# Patient Record
Sex: MALE | Race: ASIAN | NOT HISPANIC OR LATINO | Employment: FULL TIME | URBAN - METROPOLITAN AREA
[De-identification: names, ages, dates, MRNs, and addresses within clinical notes are randomized per-mention and may not be internally consistent; named-entity substitution may affect disease eponyms.]

---

## 2023-03-28 ENCOUNTER — OFFICE VISIT (OUTPATIENT)
Dept: FAMILY MEDICINE | Facility: CLINIC | Age: 48
End: 2023-03-28
Payer: COMMERCIAL

## 2023-03-28 VITALS
TEMPERATURE: 97.7 F | BODY MASS INDEX: 19.81 KG/M2 | DIASTOLIC BLOOD PRESSURE: 65 MMHG | OXYGEN SATURATION: 100 % | WEIGHT: 116 LBS | HEIGHT: 64 IN | HEART RATE: 67 BPM | SYSTOLIC BLOOD PRESSURE: 115 MMHG

## 2023-03-28 DIAGNOSIS — E05.90 HYPERTHYROIDISM: ICD-10-CM

## 2023-03-28 DIAGNOSIS — E04.2 NON-TOXIC MULTINODULAR GOITER: ICD-10-CM

## 2023-03-28 DIAGNOSIS — E04.9 GOITER: Primary | ICD-10-CM

## 2023-03-28 LAB
T4 FREE SERPL-MCNC: 1.03 NG/DL (ref 0.76–1.46)
TSH SERPL DL<=0.005 MIU/L-ACNC: 0.34 MU/L (ref 0.4–4)

## 2023-03-28 PROCEDURE — 86376 MICROSOMAL ANTIBODY EACH: CPT | Performed by: FAMILY MEDICINE

## 2023-03-28 PROCEDURE — 84443 ASSAY THYROID STIM HORMONE: CPT | Performed by: FAMILY MEDICINE

## 2023-03-28 PROCEDURE — 99204 OFFICE O/P NEW MOD 45 MIN: CPT | Performed by: FAMILY MEDICINE

## 2023-03-28 PROCEDURE — 84439 ASSAY OF FREE THYROXINE: CPT | Performed by: FAMILY MEDICINE

## 2023-03-28 PROCEDURE — 36415 COLL VENOUS BLD VENIPUNCTURE: CPT | Performed by: FAMILY MEDICINE

## 2023-03-28 NOTE — LETTER
March 30, 2023      Omar Walsh  [address] [place] [id number]        Mr. Walsh,     Sounds like you already have a referral for endocrinology.  It appears your thyroid is hyperthyroidism, and the TPO is elevated.  This could potentially mean something called Hashimoto's or Graves'.  Please let us know if you are unable to get into endocrinology in the next month or two.     Please contact the clinic if you have additional questions.  Thank you.     Sincerely,     DANG Altman, NP-C   [facility]   (For Dr. Hawley who is out of office)       Resulted Orders   TSH with free T4 reflex   Result Value Ref Range    TSH 0.34 (L) 0.40 - 4.00 mU/L   Thyroid peroxidase antibody   Result Value Ref Range    Thyroid Peroxidase Antibody 42 (H) <35 IU/mL   T4 free   Result Value Ref Range    Free T4 1.03 0.76 - 1.46 ng/dL

## 2023-03-28 NOTE — PATIENT INSTRUCTIONS
"Plan:    1) thyroid tests today to help us decide if your thyroid is running too strong or too weak or just normal    2) thyroid ultrasound to take a look at the size and make sure it looks \"normal\" but just bigger, or is there is some type of nodule or cyst making it look bigger    I will get back to you as soon as I see the results of these tests.  If you sign up for my chart you will be able to see them right away as well.  "

## 2023-03-28 NOTE — PROGRESS NOTES
Assessment & Plan     Goiter  My first visit with patient today and previous history reviewed with him and through care everywhere including a normal TSH of 0.60 back in 2017 at an outside system.  But he noticed that his neck was swollen about a week ago and he thought perhaps it is his thyroid.  Really denies any symptoms at all associated with this.  Has not had recent weight loss or weight gain or heat intolerance of any sort.  No GI symptoms.  He feels perfectly normal.  But he visually has an enlarged thyroid which holds up on palpation.  Left lobe greater than right.  Does not feel nodular.  Remainder of exam normal.  So I discussed with patient and his sister our work-up for this.  Turns out that he has a family history of multiple members having to have their thyroids removed.  Sister does not know whether this is because of Graves' disease but that may be a common reason for it.  So we are going to start with thyroid function and antibodies looking as to whether this is more in the lines of Hashimoto's versus Graves'.  We are also going to set him up with a thyroid ultrasound.  Further testing based upon these results.  - TSH with free T4 reflex; Future  - Thyroid peroxidase antibody; Future  - US Thyroid; Future         Nicotine/Tobacco Cessation:  He reports that he has been smoking cigarettes. He has never used smokeless tobacco.  Nicotine/Tobacco Cessation Plan:   Self help information given to patient      See Patient Instructions    Divya Hawley MD  [facility]    Teo Nelson is a 48 year old, presenting for the following health issues:  Thyroid Problem    Additional Questions 3/28/2023   Roomed by Deloris GUZMÁN   Accompanied by Self     Patient Reported Additional Medications 3/28/2023   Patient reports taking the following new medications None     History of Present Illness       Reason for visit:  Thyroid    He eats 4 or more servings of fruits and vegetables  "daily.He consumes 1 sweetened beverage(s) daily.He exercises with enough effort to increase his heart rate 20 to 29 minutes per day.  He exercises with enough effort to increase his heart rate 3 or less days per week.   He is taking medications regularly.         Here today to talk about an enlarged thyroid.      Review of Systems   Constitutional, HEENT, cardiovascular, pulmonary, gi and gu systems are negative, except as otherwise noted.      Objective    /65 (BP Location: Right arm, Patient Position: Sitting, Cuff Size: Adult Regular)   Pulse 67   Temp 97.7  F (36.5  C) (Oral)   Ht 1.614 m (5' 3.54\")   Wt 52.6 kg (116 lb)   SpO2 100%   BMI 20.20 kg/m    Body mass index is 20.2 kg/m .  Physical Exam   Alert, pleasant, upbeat, and in no apparent discomfort.  Thyroid region of his neck visibly enlarged.  Left lobe greater than right on palpation but the thyroid appears smooth  S1 and S2 normal, no murmurs, clicks, gallops or rubs. Regular rate and rhythm. Chest is clear; no wheezes or rales. No edema or JVD.  I note only benign skin findings. No unusual rashes or suspicious skin lesions noted. Nails appear normal. Past labs reviewed with the patient.                     "

## 2023-03-29 ENCOUNTER — ANCILLARY PROCEDURE (OUTPATIENT)
Dept: ULTRASOUND IMAGING | Facility: CLINIC | Age: 48
End: 2023-03-29
Attending: FAMILY MEDICINE
Payer: COMMERCIAL

## 2023-03-29 ENCOUNTER — TELEPHONE (OUTPATIENT)
Dept: FAMILY MEDICINE | Facility: CLINIC | Age: 48
End: 2023-03-29

## 2023-03-29 DIAGNOSIS — E04.9 GOITER: ICD-10-CM

## 2023-03-29 PROCEDURE — 76536 US EXAM OF HEAD AND NECK: CPT | Mod: TC | Performed by: RADIOLOGY

## 2023-03-29 NOTE — TELEPHONE ENCOUNTER
----- Message from Livier Restrepo sent at 3/29/2023  1:23 PM CDT -----    ----- Message -----  From: Divya Hawley MD  Sent: 3/29/2023  12:44 PM CDT  To: [facility]    Call patient:  His ultrasound showed what we call a multinodular goiter, meaning that there are multiple thyroid nodules.  None of them look bad specifically.  One of his blood test is still pending but right now it shows that his thyroid is running a little bit too strong.  So we are probably talking about early hyperthyroidism (Graves' disease) which is probably what his relatives had that led them to getting their thyroids removed.  But we do not know for sure and one of the things I think we should do is get him set up with an endocrinology specialist for further evaluation.  I am going to place that order and I think they contact him to get scheduled.  As soon as I see the other lab work coming back we will let him know as well.    MAGGIE Hawley M.D.

## 2023-03-29 NOTE — TELEPHONE ENCOUNTER
RN called patient and LVM to call clinic at [phone].     If patient calls back please relay provider message below. Endocrine referral number to schedule is [phone].    Tori Fu RN  [facility]

## 2023-03-29 NOTE — LETTER
Omar GUZMÁN Walsh  [address] [place] [phone]        Omar,      Your ultrasound showed what we call a multinodular goiter, meaning that there are multiple thyroid nodules. None of them look bad specifically.  One of your blood test is still pending but right now it shows that your thyroid is running a little bit too strong.  So we are probably talking about early hyperthyroidism (Graves' disease) which is probably what your relatives had that led them to getting their thyroids removed.  But we do not know for sure and one of the things I think we should do is get you set up with an endocrinology specialist for further evaluation.  I   placed that order and they will contact you to get scheduled. There number for reference is [phone].   As soon as I see the other lab work coming back we will let you know as well.    MAGGIE Hawley M.D.

## 2023-03-29 NOTE — RESULT ENCOUNTER NOTE
Call patient:  His ultrasound showed what we call a multinodular goiter, meaning that there are multiple thyroid nodules.  None of them look bad specifically.  One of his blood test is still pending but right now it shows that his thyroid is running a little bit too strong.  So we are probably talking about early hyperthyroidism (Graves' disease) which is probably what his relatives had that led them to getting their thyroids removed.  But we do not know for sure and one of the things I think we should do is get him set up with an endocrinology specialist for further evaluation.  I am going to place that order and I think they contact him to get scheduled.  As soon as I see the other lab work coming back we will let him know as well.    MAGGIE Hawley M.D.

## 2023-03-29 NOTE — TELEPHONE ENCOUNTER
Pt called back and was informed of provider msg and recommendations below. Pt was provided number for endo. Also going to send pt results in the mail. Updated pt address in system per his request.  Diana Drew CMA

## 2023-03-30 LAB — THYROPEROXIDASE AB SERPL-ACNC: 42 IU/ML

## 2023-03-30 NOTE — RESULT ENCOUNTER NOTE
Please send letter and results    Mr. Walsh,    Sounds like you already have a referral for endocrinology.  It appears your thyroid is hyperthyroidism, and the TPO is elevated.  This could potentially mean something called Hashimoto's or Graves'.  Please let us know if you are unable to get into endocrinology in the next month or two.    Please contact the clinic if you have additional questions.  Thank you.    Sincerely,    DANG Altman, NP-C  [facility]  (For Dr. Hawley who is out of office)

## 2023-04-04 ENCOUNTER — OFFICE VISIT (OUTPATIENT)
Dept: FAMILY MEDICINE | Facility: CLINIC | Age: 48
End: 2023-04-04
Payer: COMMERCIAL

## 2023-04-04 VITALS
DIASTOLIC BLOOD PRESSURE: 73 MMHG | HEART RATE: 54 BPM | OXYGEN SATURATION: 100 % | TEMPERATURE: 97.7 F | BODY MASS INDEX: 20.53 KG/M2 | WEIGHT: 117.9 LBS | SYSTOLIC BLOOD PRESSURE: 117 MMHG

## 2023-04-04 DIAGNOSIS — E04.2 NON-TOXIC MULTINODULAR GOITER: Primary | ICD-10-CM

## 2023-04-04 PROBLEM — E05.00 GRAVES DISEASE: Status: ACTIVE | Noted: 2023-04-04

## 2023-04-04 PROBLEM — E05.00 GRAVES DISEASE: Status: RESOLVED | Noted: 2023-04-04 | Resolved: 2023-04-04

## 2023-04-04 PROCEDURE — 99214 OFFICE O/P EST MOD 30 MIN: CPT | Performed by: FAMILY MEDICINE

## 2023-04-04 NOTE — PROGRESS NOTES
Assessment & Plan     Non-toxic multinodular goiter  Follow-up on presentation recently with goiter.  TSH slightly depressed but T4 normal.  Elevation of thyroid peroxidase antibodies.  Multinodular goiter on ultrasound.  So I discussed with patient that this most likely represents early Graves' disease.  But it still is a possibility that this is early Hashimoto's thyroiditis as well.  So we need to set up a nuclear medicine uptake scan to delineate.  I had referred him to endocrinology but unfortunately cannot get him in until September.  So if this is consistent with Graves' disease his current only physical finding is the thyromegaly.  I think he would be appropriate for Tapazole therapy at least until we can get him in with endocrinology to talk about more definitive therapy.  This might shrink his thyroid or it may not.  The goal would be to keep him euthyroid and prevent any further complications.  If more consistent with Hashimoto's thyroiditis we will follow his TSH every couple of months looking for a decrease in thyroid function.  In that case we would obviously supplement thyroid hormone.  - NM Thyroid Uptake and Scan; Future         See Patient Instructions    Divya Hawley MD  [facility]    Teo Nelson is a 48 year old, presenting for the following health issues:  Thyroid Problem        4/4/2023    10:13 AM   Additional Questions   Roomed by Deloris   Accompanied by Self         4/4/2023    10:13 AM   Patient Reported Additional Medications   Patient reports taking the following new medications None     History of Present Illness       Reason for visit:  Thyroid    He eats 4 or more servings of fruits and vegetables daily.He consumes 1 sweetened beverage(s) daily.He exercises with enough effort to increase his heart rate 20 to 29 minutes per day.  He exercises with enough effort to increase his heart rate 3 or less days per week.   He is taking medications  regularly.         Here today in follow-up of thyroid studies      Review of Systems   Constitutional, HEENT, cardiovascular, pulmonary, gi and gu systems are negative, except as otherwise noted.      Objective    /73 (BP Location: Right arm, Patient Position: Sitting, Cuff Size: Adult Regular)   Pulse 54   Temp 97.7  F (36.5  C) (Tympanic)   Wt 53.5 kg (117 lb 14.4 oz)   SpO2 100%   BMI 20.53 kg/m    Body mass index is 20.53 kg/m .  Physical Exam   Alert, pleasant, upbeat, and in no apparent discomfort.  Eyes are normal in appearance with no proptosis or erythema  S1 and S2 normal, no murmurs, clicks, gallops or rubs. Regular rate and rhythm. Chest is clear; no wheezes or rales. No edema or JVD.  Thyroid still enlarged  Past labs reviewed with the patient.   Reviewed recent imaging.

## 2023-04-04 NOTE — PATIENT INSTRUCTIONS
Multinodular goiter    1) We still do not know for sure if this means your thyroid is running too strong (Grave's Disease) or too weak (Hashimoto's thyroiditis).  Right now your thyroid function is is fairly normal, but it is probably about to either be too much or too little.    2) We are going to set up a test called a thyroid scan to figure this out.    3) If it is Graves' disease we will start on a medication to block the thyroid from releasing too much thyroid hormone and prevent other problems.  This may or may not reduce the size of the goiter.  That might need treatment later on either with radioactive iodine or possibly even surgery.  But it might shrink with that medicine as well.    4) if it is Hashimoto's thyroiditis we will keep following your thyroid function and if it starts running too low then we will add a thyroid supplement.  If this is the case the goiter might shrink on its own.

## 2023-04-10 ENCOUNTER — HOSPITAL ENCOUNTER (OUTPATIENT)
Dept: NUCLEAR MEDICINE | Facility: HOSPITAL | Age: 48
Discharge: HOME | End: 2023-04-10
Attending: FAMILY MEDICINE | Admitting: FAMILY MEDICINE
Payer: COMMERCIAL

## 2023-04-10 DIAGNOSIS — E04.2 NON-TOXIC MULTINODULAR GOITER: ICD-10-CM

## 2023-04-10 PROCEDURE — 78014 THYROID IMAGING W/BLOOD FLOW: CPT

## 2023-04-10 PROCEDURE — A9516 IODINE I-123 SOD IODIDE MIC: HCPCS | Performed by: FAMILY MEDICINE

## 2023-04-10 PROCEDURE — 343N000001 HC RX 343: Performed by: FAMILY MEDICINE

## 2023-04-10 RX ADMIN — Medication 237 MICROCURIE: at 08:38

## 2023-04-10 NOTE — LETTER
April 12, 2023      Omar Walsh  [address] [place] [address]  [place] [phone]        Dear ,    His thyroid scan was a bit inconclusive.  On the one hand we know that none of the nodules are bad (cancerous), so that is good news.  It does not look like at this time his thyroid is extremely overactive but that might change.  Unfortunately I do not know if that would occur next month for multiple amounts or what ever and I am still not entirely sure of what to do to start working on getting his thyroid down to a normal size.  We tried to schedule him with endocrinology but they are booked out until September.  So 1 option would be to do an e-consult with endocrinology, where I send them all of his information and ask for advice and they will get back to me and then I will get back to patient.  We do not even need to wait for this.  Insurance generally covers it just fine so that might be a good option instead of waiting without knowing what to do.  So if that is okay with the patient I will start an E consult.     MAGGIE Hawley M.D.                                               Resulted Orders   NM Thyroid Uptake and Scan    Narrative    EXAM: NM THYROID UPTAKE AND SCAN  LOCATION: [facility]  DATE/TIME: 4/11/2023 9:17 AM    INDICATION: Clinical and/or laboratory evidence of hyperthyroidism.  COMPARISON: Thyroid ultrasound dated 03/29/2023.  TECHNIQUE: 237 uCi I-123, oral ingestion. 24-hour neck uptake and imaging.    FINDINGS: 24-hour uptake: 16.3% (Normal range: 10-30%).      Impression    IMPRESSION:    Diffuse radiotracer uptake throughout a enlarged multinodular thyroid gland that extends into the left superior mediastinum without convincing evidence of a hot/cold nodule.

## 2023-04-11 ENCOUNTER — HOSPITAL ENCOUNTER (OUTPATIENT)
Dept: NUCLEAR MEDICINE | Facility: HOSPITAL | Age: 48
Discharge: HOME | End: 2023-04-11
Attending: FAMILY MEDICINE
Payer: COMMERCIAL

## 2023-04-12 ENCOUNTER — E-CONSULT (OUTPATIENT)
Dept: ENDOCRINOLOGY | Facility: CLINIC | Age: 48
End: 2023-04-12
Payer: COMMERCIAL

## 2023-04-12 DIAGNOSIS — E04.2 NON-TOXIC MULTINODULAR GOITER: Primary | ICD-10-CM

## 2023-04-12 PROCEDURE — 99207 PR NO BILLABLE SERVICE THIS VISIT: CPT

## 2023-04-12 PROCEDURE — 99207 E-CONSULT TO ENDOCRINOLOGY (ADULT OUTPT PROVIDER TO SPECIALIST WRITTEN QUESTION & RESPONSE): CPT | Performed by: FAMILY MEDICINE

## 2023-04-12 NOTE — RESULT ENCOUNTER NOTE
Call patient (and send a copy of labs):  His thyroid scan was a bit inconclusive.  On the one hand we know that none of the nodules are bad (cancerous), so that is good news.  It does not look like at this time his thyroid is extremely overactive but that might change.  Unfortunately I do not know if that would occur next month for multiple amounts or what ever and I am still not entirely sure of what to do to start working on getting his thyroid down to a normal size.  We tried to schedule him with endocrinology but they are booked out until September.  So 1 option would be to do an e-consult with endocrinology, where I send them all of his information and ask for advice and they will get back to me and then I will get back to patient.  We do not even need to wait for this.  Insurance generally covers it just fine so that might be a good option instead of waiting without knowing what to do.  So if that is okay with the patient I will start an E consult.    MAGGIE Hawley M.D.

## 2023-04-13 NOTE — PROGRESS NOTES
4/12/2023     E-Consult has been denied due to: Complexity of question, needs in-person referral.  Virtual visit OK    Interprofessional consultation requested by:  Divya Hawley MD      Clinical Question/Purpose: MY CLINICAL QUESTION IS: Is this Graves' disease or thyroiditis and whether to initiate treatment?  Also goiter is large and cosmetically unappealing.  Formal consultation was placed but patient cannot get in until September    Patient assessment and information reviewed:   9/27/17 TSH 0.6  3/28/23 TSh 0.34, free T4 1.03, TPO 42    3/29/2023 thyroid US    Assessment: I am concerned about the goiter- how long has it been there?  This could be a serious problem.     Recommendations: This patient was already triage to scheduling within one week  (see my 4/5/2023 note on the EMR)  The record also shows that our schedulers have been trying to reach patient, apparently unsuccessfully (see 4/5 and 4/6 notes )  He needs to  his phone and respond to the schedulers in order for us to get him scheduled. He should call [phone] to get moved forward as already directed.       The recommendations provided in this E-Consult are based on a review of clinical data pertinent to the clinical question presented, without a review of the patient's complete medical record or, the benefit of a comprehensive in-person or virtual patient evaluation. This consultation should not replace the clinical judgement and evaluation of the provider ordering this E-Consult. Any new clinical issues, or changes in patient status since the filing of this E-Consult will need to be taken into account when assessing these recommendations. Please contact me if you have further questions.    My total time spent reviewing clinical information and formulating assessment was 5 minutes.        Ramya Sanchez MD

## 2023-04-14 NOTE — TELEPHONE ENCOUNTER
OK -so I heard back from one of the endocrinologists on the E consult that we placed for him.  But our original plan was to have him seen in person by one of the specialists but it looks like they could not get him in until September.  But according to the endocrinology team they have been trying to get a hold of him and get him in much sooner.  Here is the verbiage from the endocrinologist herself:    <<  He needs to  his phone and respond to the schedulers in order for us to get him scheduled. He should call [phone] to get moved forward as already directed. >>    So please call the patient and let him know that they are waiting to hear from him and we can give him the number so he can call and get seen in person by the specialists.  So they do want to help but I wonder if his phone is blocking the numbers because it might look like a scam or something.  Basically he just needs to call and they should be expecting his call to help deal with a goiter.

## 2023-04-19 ENCOUNTER — TELEPHONE (OUTPATIENT)
Dept: FAMILY MEDICINE | Facility: CLINIC | Age: 48
End: 2023-04-19
Payer: COMMERCIAL

## 2023-04-19 NOTE — TELEPHONE ENCOUNTER
I am out of the office the rest of the week, so it'll have to be someone else.  It looks like the endocrine folks were waiting for his call to get him set up in clinic.  Not sure if that got set up or not.  There is nothing I can do to shrink his thyroid.  This needs to come from an expert but I am happy to see him next week if I have availability.  But there is nothing that can be done immediately to magically shrink his thyroid.  That's why we need expert help.

## 2023-04-19 NOTE — TELEPHONE ENCOUNTER
General    Reason for call: Patient's family member called with concerns and would like the Patient to be seen in the clinic within day or too. Patient's Thyroid is very swollen to were it is visual on the side of the neck. PCP is not available within an acceptable time frame and Oncology is not able to see the patient until September.    Call back needed? Yes    Return Call Needed  Same as documented in contacts section  When to return call?: Same day: [facility]

## 2023-04-19 NOTE — TELEPHONE ENCOUNTER
LVM for PT to contact Endocrinology at [phone]. Relayed message from below. PCP will be out of the office the rest of this week. Best to consult specialist.

## 2023-07-05 NOTE — CONFIDENTIAL NOTE
RECORDS RECEIVED FROM: internal    DATE RECEIVED: 9.20.23    NOTES (FOR ALL VISITS) STATUS DETAILS   OFFICE NOTES from referring provider internal  Divya Hawley MD     MEDICATION LIST internal     IMAGING        NUCLEAR  internal  NM thyroid- 4.10.23      ULTRASOUND (HEAD/NECK) internal  3.29.23    LABS     DIABETES: HBGA1C, CREATININE, FASTING LIPIDS, MICROALBUMIN URINE, POTASSIUM, TSH, T4    THYROID: TSH, T4, CBC, THYRODLONULIN, TOTAL T3, FREE T4, CALCITONIN, CEA internal  T4- 3.28.23  TSH- 3.28.23   Thyroid Peroxid- 3.28.23

## 2023-09-20 ENCOUNTER — PRE VISIT (OUTPATIENT)
Dept: ENDOCRINOLOGY | Facility: CLINIC | Age: 48
End: 2023-09-20